# Patient Record
Sex: MALE | Race: WHITE
[De-identification: names, ages, dates, MRNs, and addresses within clinical notes are randomized per-mention and may not be internally consistent; named-entity substitution may affect disease eponyms.]

---

## 2019-01-23 ENCOUNTER — HOSPITAL ENCOUNTER (OUTPATIENT)
Dept: HOSPITAL 88 - OR | Age: 69
Setting detail: OBSERVATION
LOS: 1 days | Discharge: HOME | End: 2019-01-24
Attending: SPECIALIST | Admitting: SPECIALIST
Payer: MEDICARE

## 2019-01-23 VITALS — DIASTOLIC BLOOD PRESSURE: 84 MMHG | SYSTOLIC BLOOD PRESSURE: 194 MMHG

## 2019-01-23 VITALS — SYSTOLIC BLOOD PRESSURE: 215 MMHG | DIASTOLIC BLOOD PRESSURE: 93 MMHG

## 2019-01-23 VITALS — SYSTOLIC BLOOD PRESSURE: 194 MMHG | DIASTOLIC BLOOD PRESSURE: 84 MMHG

## 2019-01-23 VITALS — WEIGHT: 275.56 LBS | BODY MASS INDEX: 34.26 KG/M2 | HEIGHT: 75 IN

## 2019-01-23 VITALS — SYSTOLIC BLOOD PRESSURE: 154 MMHG | DIASTOLIC BLOOD PRESSURE: 75 MMHG

## 2019-01-23 DIAGNOSIS — I10: ICD-10-CM

## 2019-01-23 DIAGNOSIS — Z85.118: ICD-10-CM

## 2019-01-23 DIAGNOSIS — N13.8: ICD-10-CM

## 2019-01-23 DIAGNOSIS — Z79.84: ICD-10-CM

## 2019-01-23 DIAGNOSIS — N40.1: Primary | ICD-10-CM

## 2019-01-23 DIAGNOSIS — E11.9: ICD-10-CM

## 2019-01-23 PROCEDURE — 52648 LASER SURGERY OF PROSTATE: CPT

## 2019-01-23 PROCEDURE — 36415 COLL VENOUS BLD VENIPUNCTURE: CPT

## 2019-01-23 PROCEDURE — 93005 ELECTROCARDIOGRAM TRACING: CPT

## 2019-01-23 PROCEDURE — 82948 REAGENT STRIP/BLOOD GLUCOSE: CPT

## 2019-01-23 RX ADMIN — ATENOLOL SCH MG: 50 TABLET ORAL at 17:28

## 2019-01-23 RX ADMIN — GLIMEPIRIDE SCH MG: 2 TABLET ORAL at 17:28

## 2019-01-23 RX ADMIN — SODIUM CHLORIDE SCH MLS/HR: 9 INJECTION, SOLUTION INTRAVENOUS at 22:30

## 2019-01-23 RX ADMIN — METFORMIN HYDROCHLORIDE SCH MG: 500 TABLET, FILM COATED ORAL at 17:28

## 2019-01-23 RX ADMIN — BICTEGRAVIR SODIUM, EMTRICITABINE, AND TENOFOVIR ALAFENAMIDE FUMARATE SCH: 50; 200; 25 TABLET ORAL at 17:00

## 2019-01-23 RX ADMIN — ISOSORBIDE MONONITRATE SCH MG: 30 TABLET, EXTENDED RELEASE ORAL at 20:26

## 2019-01-23 RX ADMIN — QUINAPRIL HYDROCHLORIDE SCH MG: 20 TABLET, FILM COATED ORAL at 20:26

## 2019-01-23 RX ADMIN — SODIUM CHLORIDE SCH MLS/HR: 9 INJECTION, SOLUTION INTRAVENOUS at 14:30

## 2019-01-23 NOTE — XMS REPORT
Clinical Summary

                             Created on: 2019



Mumtaz Holloway

External Reference #: [id number]

: 1950

Sex: Male



Demographics







                          Address                   [address] 

[place]  [address]

 

                          Home Phone                [phone]

 

                          Preferred Language        English

 

                          Marital Status            

 

                          Caodaism Affiliation     [id number]

 

                          Race                      White

 

                          Ethnic Group              Non-





Author







                          Author                    [facility]

 

                          Organization              [facility]

 

                          Address                   Unknown

 

                          Phone                     Unavailable







Support







                Name            Relationship    Address         Phone

 

                Mary Holloway    [employer]            Unknown         [phone]







Care Team Providers







                    Care Team Member Name    Role                Phone

 

                    Steven Woody MD    PCP                 [phone]







Allergies

Not on File



Medications

Not on file



Active Problems





Not on file



Encounters







                          Care Team                 Description



                     Date                Type                Specialty  

 

                                        



Brayan Woody MD              Hematuria syndrome



                     10/24/2018          Hospital            Radiology  



                                         Encounter   

 

                                        



Brayan Woody MD              Hematuria syndrome (Primary Dx)



                     10/22/2018          Transcribe          Access  



                                         Orders   

 

                                        



Goldberg, Leonard H., MD                Basal cell carcinoma of skin of nose (Primary Dx)



                     2018          Transcribe          Dermatology  



                                         Orders   

 

                                        



Goldberg, Leonard H., MD                SCC (squamous cell carcinoma), scalp/neck (Primary Dx)



                     2018          Transcribe          Dermatology  



                                         Orders   



after 2018



Social History







                                        Date



                 Tobacco Use     Types           Packs/Day       Years Used 

 

                                         



                                         Never Assessed    









 



                           Sex Assigned at Birth     Date Recorded

 

 



                                         Not on file 









                                        Industry



                           Job Start Date            Occupation 

 

                                        Not on file



                           Not on file               Not on file 









                                        Travel End



                           Travel History            Travel Start 

 





                                         No recent travel history available.







Last Filed Vital Signs

Not on file



Plan of Treatment







   



                 Health Maintenance     Due Date        Last Done       Comments

 

   



                           COLON CANCER SCREENING     1998  

 

   



                           SHINGLES VACCINES ( of     1998  



                                         2)   

 

   



                           PNEUMOCOCCAL              2013  



                                         POLYSACCHARIDE VACCINE   



                                         AGE 65 AND OVER   

 

   



                           PNEUMOCOCCAL-13           2013  

 

   



                     INFLUENZA VACCINE     2018 







Procedures







                                        Comments



                 Procedure Name     Priority        Date/Time       Associated Diagnosis 

 

                                        



Results for this procedure are in the results section.



                 CT ABDOMEN PELVIS W     Routine         10/24/2018      Hematuria syndrome 



                           CONTRAST                  1:57 PM CDT  

 

                                        



Results for this procedure are in the results section.



                     ESTIMATED GFR       Routine             10/24/2018  



                                         1:39 PM CDT  

 

                                        



Results for this procedure are in the results section.



                     POC CREATININE      Routine             10/24/2018  



                                         1:39 PM CDT  



after 2018



Results

* CT Abdomen Pelvis W Contrast (10/24/2018  1:57 PM CDT)





 



                           Narrative                 Performed At

 

 



                           EXAMINATION:CT ABDOMEN PELVIS W CONTRAST      [facility]



                                         CLINICAL HISTORY:R31.9 Hematuriaunspecified, r31.9 



                                         TECHNIQUE: Multiple axial images of the abdomen and pelvis were obtained 



                                         following intravenous administration of iodinated contrast. Sagittal and coronal

 



                                         computerized reformatted images were also obtained. CT imaging was performed 



                                         with iterative 



                                         reconstruction technique and/or automated exposure control to reduce radiation 





                                         dose. 



                                         COMPARISON:None. 



                                         FINDINGS: 



                                         Abdomen: 



                                         Heart size is within normal limits.. 



                                         Lung bases demonstrate right chest wall deformity and right pleural fat. Left 



                                         lung base is unremarkable. 



                                         Liver is fatty. 



                                         Gallbladder is unremarkable.. 



                                         Spleen, pancreas, and adrenals are within normal limits. 



                                         A small to moderate-sized wedge-shaped area of mild hypoattenuation is seen in 





                                         the posterior left upper pole of the kidney which may reflect pyelonephritis or

 



                                         infarction. 



                                         The abdominal aorta is atherosclerotic.. 



                                         Appendix is unremarkable. 



                                         Pelvis: 



                                         There is no enlarged pelvic lymph node.Bladder is unremarkable.Bones are

 



                                         age appropriate.The distal sigmoid colon is collapsed but the wall appears 





                                         slightly prominent. This is probably due to its collapsed state but some wall 



                                         thickening such as 



                                         that related to colitis cannot be entirely excluded. Recommend clinical 



                                         correlation. 



                                         IMPRESSION: 



                                         Finding compatible with mild pyelonephritis or ischemia/infarction in the upper

 



                                         pole left kidney. Questionable distal colonic wall thickening. Hepatic 



                                         steatosis. 



                                         [facility]-[id number] 









                                        Procedure Note

 

                                        



 Interface, Radiology Results Incoming - 10/24/2018  2:26 PM CDT



EXAMINATION:  CT ABDOMEN PELVIS W CONTRAST



CLINICAL HISTORY:  R31.9 Hematuria  unspecified, r31.9



TECHNIQUE: Multiple axial images of the abdomen and pelvis were obtained 
following intravenous administration of iodinated contrast. Sagittal and coronal
computerized reformatted images were also obtained. CT imaging was performed 
with iterative 

reconstruction technique and/or automated exposure control to reduce radiation 
dose.





COMPARISON:  None.



FINDINGS:



Abdomen:



Heart size is within normal limits..



Lung bases demonstrate right chest wall deformity and right pleural fat. Left 
lung base is unremarkable.



Liver is fatty.  



Gallbladder is unremarkable..



Spleen, pancreas, and adrenals are within normal limits.



A small to moderate-sized wedge-shaped area of mild hypoattenuation is seen in 
the posterior left upper pole of the kidney which may reflect pyelonephritis or 
infarction.



The abdominal aorta is atherosclerotic..



Appendix is unremarkable. 





Pelvis:



There is no enlarged pelvic lymph node.  Bladder is unremarkable.  Bones are age
appropriate.  The distal sigmoid colon is collapsed but the wall appears 
slightly prominent. This is probably due to its collapsed state but some wall 
thickening such as 

that related to colitis cannot be entirely excluded. Recommend clinical 
correlation.



IMPRESSION:



Finding compatible with mild pyelonephritis or ischemia/infarction in the upper 
pole left kidney. Questionable distal colonic wall thickening. Hepatic 
steatosis.







[facility]-[id number]











   



                 Performing Organization     Address         City/State/Zipcode     Phone Number

 

   



                     [facility]          [address] [place] [address] 





* Estimated GFR (10/24/2018  1:39 PM CDT)





   



                 Component       Value           Ref Range       Performed At

 

   



                 Estimated GFR     86              mL/min/1.73 m2     [facility] [place]



                           Comment:                  PATHOLOGY AND



                           CatergoryUnitsInte      GENOMIC MEDICINE



                                         rpretation  



                                         G1  



                                         >=90 Normal or high  



                                         G2  



                                         60-89Mildly decreased  



                                         W4w91-86  



                                         Mildly to moderately  



                                         decreased  



                                         E4p12-46  



                                         Moderately to severely  



                                         decreased  



                                         G4  



                                         15-29Severely  



                                         decreased  



                                         G5  



                                         <15Kidney failure  



                                         The eGFR was calculated using  



                                         the Chronic Kidney Disease  



                                         Epidemiology Collaboration  



                                         (CKD-EPI) equation.  



                                         Interpretation is based on  



                                         recommendations of the  



                                         National Kidney  



                                         Foundation-Kidney Disease  



                                         Outcomes Quality  



                                         Initiative (NKF-KDOQI)  



                                         published in 2014.  













                                         Specimen

 





                                         Blood









   



                 Performing Organization     Address         City[facility]     Phone Number

 

   



                     [facility] [address] [address] 



                                         PATHOLOGY AND GENOMIC   



                                         MEDICINE   





* POC creatinine (10/24/2018  1:39 PM CDT)





   



                 Component       Value           Ref Range       Performed At

 

   



                 POC creatinine     0.9             0.7 - 1.2 mg/dl     [facility] OF



                           Comment:                  PATHOLOGY AND



                           Meter ID: [id number]          GENOMIC MEDICINE



                                         : Mari Dooley  













                                         Specimen

 





                                         Blood









   



                 Performing Organization     Address         City/[facility]/Zipcode     Phone Number

 

   



                     [facility]     [address] [place] [address] 



                                         PATHOLOGY AND GENOMIC   



                                         MEDICINE   





after 2018



Insurance







     



            Payer      Benefit     Subscriber ID     Type       Phone      Address



                                         Plan /    



                                         Group    

 

     



              MEDICARE     MEDICARE     xxxxxxxxxxx     Medicare      [place], [place]



                                         PART A AND    



                                         B    

 

     



                 [facility] MED        xxxxxxxxxxxx     Commercial  



                                         SUPPLEMENT    









     



            Guarantor Name     Account     Relation to     Date of     Phone      Billing Address



                     Type                Patient             Birth  

 

     



            Mumtaz Holloway     Personal/F     Self       1950     [phone]

 [address]               ([place])              [place] [address]







Advance Directives





Patient has advance care planning documents on file. For more information, dori bueno contact:



Aly Donald



[address] [place] [address]

## 2019-01-23 NOTE — OPERATIVE REPORT
DATE OF PROCEDURE:  January 23, 2019 

 

PREOPERATIVE DIAGNOSES 

1. Benign prostatic hypertrophy.

2. History of carcinoma of the lung. 



POSTOPERATIVE DIAGNOSES 

1. Benign prostatic hypertrophy.

2. History of carcinoma of the lung. 



OPERATIONS 

1. Cystourethroscopy.  

2. Transrectal ultrasound of the prostate.

3. Urethral dilation with the [employer]ucks sounds. 



ANESTHETIST:  Dr. Peña.



ANESTHETIC:  General.



Mr. Holloway is a 68-year-old male who presented with the chief complaint 

of lower urinary tract obstructive symptoms.  Rectal exam showed an 

enlarged prostate gland, about 70 to 80 g firm and benign.  His PSA was 

normal.    



This patient was placed on the table in the lithotomy position and was 

prepped and draped in a sterile manner after satisfactory anesthesia.  



A #23 cystoresectoscope was used and a cystourethroscopy was performed and 

confirmed the previous cystoscopic findings.  



The XPS Laserscope generator was then started, starting at 120 multani 

vaporization level and 40 multani coagulation level.  



Vaporization of the prostate was then started at 12 o'clock position, 

vaporizing the anterior tissue.  



Vaporization was then started, starting at 11 o'clock to 7 o'clock, 

starting at the bladder neck to just proximal to the verumontanum and down 

to the capsular fibers.  Hemostasis was obtained all through and was very 

adequate.  



Vaporization of the prostate was then started, starting again from 1 

o'clock to 5 o'clock, again starting at the bladder neck to just proximal 

to the verumontanum and down to the capsular fibers.  Hemostasis was 

obtained all through and was very adequate.  At the termination of the 

procedure, it was noted that the bladder mucosa, both ureteral orifices and 

the external sphincter were intact without any laser energy damage.  The 

Laserscope generator was shut down.  The cystoresectoscope was removed, and 

a #22-Syrian Tobar catheter was placed on mild traction.  Estimated blood 

loss was about 25 to 30 mL.   









DD:  01/23/2019 11:05

DT:  01/23/2019 11:23

Job#:  [id number]

## 2019-01-23 NOTE — NUR
MD RETURNED CALL. RECEIVED ORDER TO GIVE ALL BLOOD PRESSURE MEDICATIONS. WILL CONTINUE TO 
MONITOR PATIENT.

## 2019-01-23 NOTE — NUR
received report from day nurse. patient is resting comfortably in bed. patient continues on 
continuous bladder irrigation. suazo is hanging from bedside. suazo is patent and urine is 
flowing into bag. bed is in lowest position and call light is within reach. will continue to 
monitor patient.

## 2019-01-23 NOTE — NUR
PATIENT HAS AN ELEVATED BLOOD PRESSURE. ATTEMPTED TO PAGE DOCTOR. UNABLE TO GET THROUGH. 
WILL CONTINUE TO PAGE DOCTOR.

## 2019-01-23 NOTE — XMS REPORT
Clinical Summary

                             Created on: 2019



Mumtaz Holloway

External Reference #: [id number]

: 1950

Sex: Male



Demographics







                          Address                   [address] 

[place]  [address]

 

                          Home Phone                [phone]

 

                          Preferred Language        English

 

                          Marital Status            

 

                          Protestant Affiliation     [id number]

 

                          Race                      White

 

                          Ethnic Group              Non-





Author







                          Author                    [facility]

 

                          Organization              [facility]

 

                          Address                   Unknown

 

                          Phone                     Unavailable







Support







                Name            Relationship    Address         Phone

 

                Mary Holloway    [employer]            Unknown         [phone]







Care Team Providers







                    Care Team Member Name    Role                Phone

 

                    Steven Woody MD    PCP                 [phone]







Allergies

Not on File



Medications

Not on file



Active Problems





Not on file



Encounters







                          Care Team                 Description



                     Date                Type                Specialty  

 

                                        



Brayan Woody MD              Hematuria syndrome



                     10/24/2018          Hospital            Radiology  



                                         Encounter   

 

                                        



Brayan Woody MD              Hematuria syndrome (Primary Dx)



                     10/22/2018          Transcribe          Access  



                                         Orders   

 

                                        



Goldberg, Leonard H., MD                Basal cell carcinoma of skin of nose (Primary Dx)



                     2018          Transcribe          Dermatology  



                                         Orders   

 

                                        



Goldberg, Leonard H., MD                SCC (squamous cell carcinoma), scalp/neck (Primary Dx)



                     2018          Transcribe          Dermatology  



                                         Orders   



after 2018



Social History







                                        Date



                 Tobacco Use     Types           Packs/Day       Years Used 

 

                                         



                                         Never Assessed    









 



                           Sex Assigned at Birth     Date Recorded

 

 



                                         Not on file 









                                        Industry



                           Job Start Date            Occupation 

 

                                        Not on file



                           Not on file               Not on file 









                                        Travel End



                           Travel History            Travel Start 

 





                                         No recent travel history available.







Last Filed Vital Signs

Not on file



Plan of Treatment







   



                 Health Maintenance     Due Date        Last Done       Comments

 

   



                           COLON CANCER SCREENING     1998  

 

   



                           SHINGLES VACCINES ( of     1998  



                                         2)   

 

   



                           PNEUMOCOCCAL              2013  



                                         POLYSACCHARIDE VACCINE   



                                         AGE 65 AND OVER   

 

   



                           PNEUMOCOCCAL-13           2013  

 

   



                     INFLUENZA VACCINE     2018 







Procedures







                                        Comments



                 Procedure Name     Priority        Date/Time       Associated Diagnosis 

 

                                        



Results for this procedure are in the results section.



                 CT ABDOMEN PELVIS W     Routine         10/24/2018      Hematuria syndrome 



                           CONTRAST                  1:57 PM CDT  

 

                                        



Results for this procedure are in the results section.



                     ESTIMATED GFR       Routine             10/24/2018  



                                         1:39 PM CDT  

 

                                        



Results for this procedure are in the results section.



                     POC CREATININE      Routine             10/24/2018  



                                         1:39 PM CDT  



after 2018



Results

* CT Abdomen Pelvis W Contrast (10/24/2018  1:57 PM CDT)





 



                           Narrative                 Performed At

 

 



                           EXAMINATION:CT ABDOMEN PELVIS W CONTRAST      [facility]



                                         CLINICAL HISTORY:R31.9 Hematuriaunspecified, r31.9 



                                         TECHNIQUE: Multiple axial images of the abdomen and pelvis were obtained 



                                         following intravenous administration of iodinated contrast. Sagittal and coronal

 



                                         computerized reformatted images were also obtained. CT imaging was performed 



                                         with iterative 



                                         reconstruction technique and/or automated exposure control to reduce radiation 





                                         dose. 



                                         COMPARISON:None. 



                                         FINDINGS: 



                                         Abdomen: 



                                         Heart size is within normal limits.. 



                                         Lung bases demonstrate right chest wall deformity and right pleural fat. Left 



                                         lung base is unremarkable. 



                                         Liver is fatty. 



                                         Gallbladder is unremarkable.. 



                                         Spleen, pancreas, and adrenals are within normal limits. 



                                         A small to moderate-sized wedge-shaped area of mild hypoattenuation is seen in 





                                         the posterior left upper pole of the kidney which may reflect pyelonephritis or

 



                                         infarction. 



                                         The abdominal aorta is atherosclerotic.. 



                                         Appendix is unremarkable. 



                                         Pelvis: 



                                         There is no enlarged pelvic lymph node.Bladder is unremarkable.Bones are

 



                                         age appropriate.The distal sigmoid colon is collapsed but the wall appears 





                                         slightly prominent. This is probably due to its collapsed state but some wall 



                                         thickening such as 



                                         that related to colitis cannot be entirely excluded. Recommend clinical 



                                         correlation. 



                                         IMPRESSION: 



                                         Finding compatible with mild pyelonephritis or ischemia/infarction in the upper

 



                                         pole left kidney. Questionable distal colonic wall thickening. Hepatic 



                                         steatosis. 



                                         [facility]-[id number] 









                                        Procedure Note

 

                                        



 Interface, Radiology Results Incoming - 10/24/2018  2:26 PM CDT



EXAMINATION:  CT ABDOMEN PELVIS W CONTRAST



CLINICAL HISTORY:  R31.9 Hematuria  unspecified, r31.9



TECHNIQUE: Multiple axial images of the abdomen and pelvis were obtained 
following intravenous administration of iodinated contrast. Sagittal and coronal
computerized reformatted images were also obtained. CT imaging was performed 
with iterative 

reconstruction technique and/or automated exposure control to reduce radiation 
dose.





COMPARISON:  None.



FINDINGS:



Abdomen:



Heart size is within normal limits..



Lung bases demonstrate right chest wall deformity and right pleural fat. Left 
lung base is unremarkable.



Liver is fatty.  



Gallbladder is unremarkable..



Spleen, pancreas, and adrenals are within normal limits.



A small to moderate-sized wedge-shaped area of mild hypoattenuation is seen in 
the posterior left upper pole of the kidney which may reflect pyelonephritis or 
infarction.



The abdominal aorta is atherosclerotic..



Appendix is unremarkable. 





Pelvis:



There is no enlarged pelvic lymph node.  Bladder is unremarkable.  Bones are age
appropriate.  The distal sigmoid colon is collapsed but the wall appears 
slightly prominent. This is probably due to its collapsed state but some wall 
thickening such as 

that related to colitis cannot be entirely excluded. Recommend clinical 
correlation.



IMPRESSION:



Finding compatible with mild pyelonephritis or ischemia/infarction in the upper 
pole left kidney. Questionable distal colonic wall thickening. Hepatic 
steatosis.







[facility]-[id number]











   



                 Performing Organization     Address         City/State/Zipcode     Phone Number

 

   



                     [facility]          [address] [place] [address] 





* Estimated GFR (10/24/2018  1:39 PM CDT)





   



                 Component       Value           Ref Range       Performed At

 

   



                 Estimated GFR     86              mL/min/1.73 m2     [facility] [place] DEPARTMENT OF



                           Comment:                  PATHOLOGY AND



                           CatergoryUnitsInte      GENOMIC MEDICINE



                                         rpretation  



                                         G1  



                                         >=90 Normal or high  



                                         G2  



                                         60-89Mildly decreased  



                                         [id number]  



                                         Mildly to moderately  



                                         decreased  



                                         F2l26-75  



                                         Moderately to severely  



                                         decreased  



                                         G4  



                                         15-29Severely  



                                         decreased  



                                         G5  



                                         <15Kidney failure  



                                         The eGFR was calculated using  



                                         the Chronic Kidney Disease  



                                         Epidemiology Collaboration  



                                         (CKD-EPI) equation.  



                                         Interpretation is based on  



                                         recommendations of the  



                                         National Kidney  



                                         Foundation-Kidney Disease  



                                         Outcomes Quality  



                                         Initiative (NKF-KDOQI)  



                                         published in 2014.  













                                         Specimen

 





                                         Blood









   



                 Performing Organization     Address         City[facility]     Phone Number

 

   



                     [facility] [place] DEPARTMENT [address] [address] 



                                         PATHOLOGY AND GENOMIC   



                                         MEDICINE   





* POC creatinine (10/24/2018  1:39 PM CDT)





   



                 Component       Value           Ref Range       Performed At

 

   



                 POC creatinine     0.9             0.7 - 1.2 mg/dl     [facility] [place] DEPARTMENT OF



                           Comment:                  PATHOLOGY AND



                           Meter ID: [id number]          GENOMIC MEDICINE



                                         : Mari Dooley  













                                         Specimen

 





                                         Blood









   



                 Performing Organization     Address         City/[facility]/Zipcode     Phone Number

 

   



                     [facility] [place] DEPARTMENT OF     [address] [place] [address] 



                                         PATHOLOGY AND GENOMIC   



                                         MEDICINE   





after 2018



Insurance







     



            Payer      Benefit     Subscriber ID     Type       Phone      Address



                                         Plan /    



                                         Group    

 

     



              MEDICARE     MEDICARE     xxxxxxxxxxx     Medicare      [place], [place]



                                         PART A AND    



                                         B    

 

     



                 [facility] MED        xxxxxxxxxxxx     Commercial  



                                         SUPPLEMENT    









     



            Guarantor Name     Account     Relation to     Date of     Phone      Billing Address



                     Type                Patient             Birth  

 

     



            Mumtaz Holloway     Personal/F     Self       1950     [phone]

 [address]               ([place])              [place] [address]







Advance Directives





Patient has advance care planning documents on file. For more information, dori bueno contact:



Aly Donald



[address] [facility] [place] [address]

## 2019-01-24 VITALS — DIASTOLIC BLOOD PRESSURE: 67 MMHG | SYSTOLIC BLOOD PRESSURE: 150 MMHG

## 2019-01-24 VITALS — SYSTOLIC BLOOD PRESSURE: 170 MMHG | DIASTOLIC BLOOD PRESSURE: 68 MMHG

## 2019-01-24 VITALS — SYSTOLIC BLOOD PRESSURE: 186 MMHG | DIASTOLIC BLOOD PRESSURE: 80 MMHG

## 2019-01-24 VITALS — SYSTOLIC BLOOD PRESSURE: 152 MMHG | DIASTOLIC BLOOD PRESSURE: 67 MMHG

## 2019-01-24 VITALS — SYSTOLIC BLOOD PRESSURE: 176 MMHG | DIASTOLIC BLOOD PRESSURE: 85 MMHG

## 2019-01-24 VITALS — DIASTOLIC BLOOD PRESSURE: 71 MMHG | SYSTOLIC BLOOD PRESSURE: 156 MMHG

## 2019-01-24 VITALS — DIASTOLIC BLOOD PRESSURE: 67 MMHG | SYSTOLIC BLOOD PRESSURE: 152 MMHG

## 2019-01-24 RX ADMIN — METFORMIN HYDROCHLORIDE SCH MG: 500 TABLET, FILM COATED ORAL at 08:44

## 2019-01-24 RX ADMIN — ISOSORBIDE MONONITRATE SCH MG: 30 TABLET, EXTENDED RELEASE ORAL at 08:43

## 2019-01-24 RX ADMIN — SODIUM CHLORIDE SCH MLS/HR: 9 INJECTION, SOLUTION INTRAVENOUS at 14:30

## 2019-01-24 RX ADMIN — SODIUM CHLORIDE SCH MLS/HR: 9 INJECTION, SOLUTION INTRAVENOUS at 10:07

## 2019-01-24 RX ADMIN — GLIMEPIRIDE SCH MG: 2 TABLET ORAL at 08:44

## 2019-01-24 RX ADMIN — ATENOLOL SCH MG: 50 TABLET ORAL at 08:43

## 2019-01-24 RX ADMIN — QUINAPRIL HYDROCHLORIDE SCH MG: 20 TABLET, FILM COATED ORAL at 08:44

## 2019-01-24 RX ADMIN — BICTEGRAVIR SODIUM, EMTRICITABINE, AND TENOFOVIR ALAFENAMIDE FUMARATE SCH: 50; 200; 25 TABLET ORAL at 08:37

## 2019-01-24 NOTE — NUR
SOCIAL WORK INITIAL ASSESSMENT 

 to bedside to discuss plan of care with patient/family. CM/SW role and care 
transitions discussed. Anticipated discharge plan discussed along with duration of care. 
CM/SW discussed patients right to make decisions in care.  CM/SW work hours given.  

Patient lives: IN HOUSE WITH FAMILY

Admit/Transfer: VIA ED FROM HOME

POA/Emergency contact: ANTONIO AGUILAR [phone]

Current/Previous Home Health: NONE

PCP/Follow-up Care: LOYD

Current/Previous DME: NONE

Other Services: NONE

Employment Status: NONE

Areas of Concerns: NONE

Referral Needs: NONE

Education Needs: NONE

IMM/MOON given and signed (if applicable): CANTU

Goal for discharge: RETURN HOME INDEPENDENTLY

CM/SW left business card at the bedside with contact information. Name and number was also 
written on the patients whiteboard. Patient verbalized understanding of discussion. CM will 
follow-up with ongoing discharge and transition of care needs.

## 2019-01-24 NOTE — NUR
Automated blood pressure machines are giving high blood pressure readings. patient's blood 
pressure is being rechecked with manual blood pressure cuff. manual blood pressure readings 
are lower than automated cuff readings. will continue to monitor patient's blood pressure.

## 2019-01-24 NOTE — NUR
automated blood pressure cuff gave a reading of 209/79. patient's blood pressure was checked 
manually and found to be 170/68. will continue to monitor patient's blood pressure.

## 2019-01-24 NOTE — NUR
report given to day nurse. patient is resting comfortably in bed. bed is in lowest position 
and call light is within reach.

## 2019-01-24 NOTE — NUR
patient's blood pressure has been rechecked manually and found to be 140/63. patient is 
lying in bed. no complaints of distress noted. bed is in lowest position. will continue to 
monitor patient.

## 2019-01-24 NOTE — NUR
pt remained stable throughout shift, dc orders placed. cont irrigation dc. pt provided with 
leg bag and fc instructions. ed to contact md if difficulty with urinating.